# Patient Record
Sex: FEMALE | Race: WHITE
[De-identification: names, ages, dates, MRNs, and addresses within clinical notes are randomized per-mention and may not be internally consistent; named-entity substitution may affect disease eponyms.]

---

## 2017-12-30 ENCOUNTER — HOSPITAL ENCOUNTER (EMERGENCY)
Dept: HOSPITAL 45 - C.EDB | Age: 27
Discharge: HOME | End: 2017-12-30
Payer: COMMERCIAL

## 2017-12-30 VITALS
BODY MASS INDEX: 38.82 KG/M2 | BODY MASS INDEX: 38.82 KG/M2 | WEIGHT: 197.75 LBS | WEIGHT: 197.75 LBS | HEIGHT: 60 IN | HEIGHT: 60 IN

## 2017-12-30 VITALS — OXYGEN SATURATION: 99 % | DIASTOLIC BLOOD PRESSURE: 88 MMHG | SYSTOLIC BLOOD PRESSURE: 128 MMHG | HEART RATE: 74 BPM

## 2017-12-30 VITALS — TEMPERATURE: 97.34 F

## 2017-12-30 DIAGNOSIS — N23: Primary | ICD-10-CM

## 2017-12-30 LAB
ALBUMIN SERPL-MCNC: 3.7 GM/DL (ref 3.4–5)
ALP SERPL-CCNC: 111 U/L (ref 45–117)
ALT SERPL-CCNC: 24 U/L (ref 12–78)
AST SERPL-CCNC: 17 U/L (ref 15–37)
BASOPHILS # BLD: 0.03 K/UL (ref 0–0.2)
BASOPHILS NFR BLD: 0.2 %
BUN SERPL-MCNC: 17 MG/DL (ref 7–18)
CALCIUM SERPL-MCNC: 9 MG/DL (ref 8.5–10.1)
CO2 SERPL-SCNC: 29 MMOL/L (ref 21–32)
CREAT SERPL-MCNC: 0.86 MG/DL (ref 0.6–1.2)
EOS ABS #: 0.05 K/UL (ref 0–0.5)
EOSINOPHIL NFR BLD AUTO: 338 K/UL (ref 130–400)
GLUCOSE SERPL-MCNC: 139 MG/DL (ref 70–99)
HCT VFR BLD CALC: 41.9 % (ref 37–47)
HGB BLD-MCNC: 14.1 G/DL (ref 12–16)
IG#: 0.06 K/UL (ref 0–0.02)
IMM GRANULOCYTES NFR BLD AUTO: 27.9 %
LIPASE: 102 U/L (ref 73–393)
LYMPHOCYTES # BLD: 3.36 K/UL (ref 1.2–3.4)
MCH RBC QN AUTO: 26.7 PG (ref 25–34)
MCHC RBC AUTO-ENTMCNC: 33.7 G/DL (ref 32–36)
MCV RBC AUTO: 79.4 FL (ref 80–100)
MONO ABS #: 0.85 K/UL (ref 0.11–0.59)
MONOCYTES NFR BLD: 7.1 %
NEUT ABS #: 7.7 K/UL (ref 1.4–6.5)
NEUTROPHILS # BLD AUTO: 0.4 %
NEUTROPHILS NFR BLD AUTO: 63.9 %
PMV BLD AUTO: 9.4 FL (ref 7.4–10.4)
POTASSIUM SERPL-SCNC: 3.6 MMOL/L (ref 3.5–5.1)
PROT SERPL-MCNC: 8.9 GM/DL (ref 6.4–8.2)
RED CELL DISTRIBUTION WIDTH CV: 13.7 % (ref 11.5–14.5)
RED CELL DISTRIBUTION WIDTH SD: 38.9 FL (ref 36.4–46.3)
SODIUM SERPL-SCNC: 136 MMOL/L (ref 136–145)
WBC # BLD AUTO: 12.05 K/UL (ref 4.8–10.8)

## 2017-12-30 NOTE — EMERGENCY ROOM VISIT NOTE
History


Report prepared by Lorraine:  Queenie Oconnor


Under the Supervision of:  NIMESH MarinO.


First contact with patient:  15:26


Chief Complaint:  FLANK PAIN


Stated Complaint:  PAIN IN R SIDE





History of Present Illness


The patient is a 27 year old female who presents to the Emergency Room with 

complaints of persistent right lower quadrant pain that began a few days ago. 

The patient states that she has nausea and diarrhea.  He does describe the pain 

as a constant dullness.  Nothing really improves her pain significantly worsens 

it.  She describes her discomfort as similar to when she had kidney stones in 

the past. The patient states she took 2 Naproxen prior to arrival, which only 

relieved some of her discomfort.  She notes she has an IUD in place.  Pt denies 

headache, change in vision, fevers, chest pain, shortness of breath, vomiting, 

pain with urination, and melena. Patient denies diabetes, hypertension, 

hyperlipidemia, CAD, history of sudden death at a young age, and smoking.





   Source of History:  patient


   Onset:  few days ago


   Position:  abdomen (RLQ)


   Timing:  other (persistent)


   Associated Symptoms:  + nausea, + diarrhea





Review of Systems


See HPI for pertinent positives & negatives. A total of 10 systems reviewed and 

were otherwise negative.





Family History





Patient reports no known family medical history.





Social History


Smoking Status:  Never Smoker


Housing Status:  lives with family





Current/Historical Medications


Scheduled


Iud's (Paragard Intrauterine ), 1 EA INT UTER UD


Tamsulosin Hcl (Flomax), 0.4 MG PO DAILY





Scheduled PRN


Ibuprofen Tab (Advil), 200 MG PO UD PRN for Pain


Naproxen (Naprosyn), 250 MG PO UD PRN for Pain


Oxycodone Immediate Rel Tab (Roxicodone Ir), 5 MG PO Q4H PRN for Severe Pain





Allergies


Coded Allergies:  


     No Known Allergies (Unverified , 1/15/17)





Physical Exam


Vital Signs











  Date Time  Temp Pulse Resp B/P (MAP) Pulse Ox O2 Delivery O2 Flow Rate FiO2


 


12/30/17 18:44  74 16 128/88 99   


 


12/30/17 16:24  84 14 126/84 97   


 


12/30/17 15:22 36.3 105 16 161/83 99 Room Air  











Physical Exam


GENERAL: Sitting up in bed, alert, well appearing, well nourished, no distress, 

non-toxic 


EYE EXAM: normal conjunctiva. 


OROPHARYNX: no exudate, no erythema, lips, buccal mucosa, and tongue normal and 

mucous membranes are moist


NECK: supple, no nuchal rigidity, no adenopathy, non-tender


LUNGS: Clear to auscultation. Normal chest wall mechanics


HEART: no murmurs, S1 normal and S2 normal 


ABDOMEN: Minimal tenderness in right lower quadrant. Abdomen soft, non-tender, 

normo-active bowel sounds, no masses, no rebound or guarding. 


BACK: Back is symmetrical on inspection and there is no deformity, no midline 

tenderness, no CVA tenderness. 


SKIN: no rashes and no bruising 


UPPER EXTREMITIES: upper extremities are grossly normal. 


LOWER EXTREMITIES: No pitting edema.


NEURO EXAM: Normal sensorium, cranial nerves II-XII grossly intact, normal 

speech,  no gross weakness of arms, no gross weakness of legs.





Medical Decision & Procedures


ER Provider


Diagnostic Interpretation:


Radiology results as stated below per my review and the radiologist's 

interpretation: 





ABD/PELVIS NO IV OR ORAL CONT





CLINICAL HISTORY: 27 years-old Female presenting with rlq/flank pain . 





TECHNIQUE: Multidetector CT of the abdomen and pelvis was performed without the


use of intravenous contrast. IV contrast: None. A dose lowering technique was


used consistent with the principles of ALARA (as low as reasonably achievable). 





COMPARISON: 11/20/2016.





CT DOSE (mGy.cm): The estimated cumulative dose is 960.70 mGy.cm.





FINDINGS:





 topogram: Unremarkable.





Lung bases: Lungs and pleural spaces clear. Normal heart size. No pericardial or


pleural effusion. 





Liver: Normal morphology. Density borderline for hepatic steatosis.





Biliary: No gross biliary ductal dilatation allowing for noncontrast technique.


Gallbladder decompressed.





Pancreas: Normal noncontrast appearance.





Spleen: Normal noncontrast appearance.





Adrenal glands: Normal noncontrast appearance.





Kidneys and ureters: Normal parenchyma allowing for noncontrast technique.


Multiple bilateral punctate renal calculi. Minimal pelvocaliectasis on the right


with an obstructing 3 mm calculus at the right renal pelvis. The remainder of


the right ureter is normal. No left hydronephrosis. Left ureter normal.





Bladder: Normal.





Pelvic organs: An intrauterine device is in place. Otherwise normal noncontrast


appearance of the uterus and ovaries.





Bowel: Normal appendix. No bowel obstruction.





Peritoneal cavity: No free fluid or intraperitoneal gas.





Lymph nodes: No gross lymphadenopathy allowing for noncontrast technique.





Vasculature: Normal noncontrast appearance.





Abdominal wall: Small fat-containing umbilical hernia.





Musculoskeletal: Normal.





IMPRESSION:


1.  Obstructing 3 mm calculus at the right renal pelvis with resultant minimal


right hydronephrosis.





2.  Bilateral nephrolithiasis.





3.  Borderline hepatic steatosis.





Electronically signed by:  Alpesh Steele M.D.


12/30/2017 5:21 PM





Dictated Date/Time:  12/30/2017 5:16 PM





Laboratory Results


12/30/17 15:45








Red Blood Count 5.28, Mean Corpuscular Volume 79.4, Mean Corpuscular Hemoglobin 

26.7, Mean Corpuscular Hemoglobin Concent 33.7, Mean Platelet Volume 9.4, 

Neutrophils (%) (Auto) 63.9, Lymphocytes (%) (Auto) 27.9, Monocytes (%) (Auto) 

7.1, Eosinophils (%) (Auto) 0.4, Basophils (%) (Auto) 0.2, Neutrophils # (Auto) 

7.70, Lymphocytes # (Auto) 3.36, Monocytes # (Auto) 0.85, Eosinophils # (Auto) 

0.05, Basophils # (Auto) 0.03





12/30/17 15:45

















Test


  12/30/17


00:00 12/30/17


15:45


 


Urine Color YELLOW  


 


Urine Appearance CLEAR (CLEAR)  


 


Urine pH 5.5 (4.5-7.5)  


 


Urine Specific Gravity


  1.024


(1.000-1.030) 


 


 


Urine Protein NEG (NEG)  


 


Urine Glucose (UA) NEG (NEG)  


 


Urine Ketones NEG (NEG)  


 


Urine Occult Blood 3+ (NEG)  


 


Urine Nitrite NEG (NEG)  


 


Urine Bilirubin NEG (NEG)  


 


Urine Urobilinogen NEG (NEG)  


 


Urine Leukocyte Esterase NEG (NEG)  


 


Urine WBC (Auto) 1-5 /hpf (0-5)  


 


Urine RBC (Auto) >30 /hpf (0-4)  


 


Urine Hyaline Casts (Auto) 0 /lpf (0-5)  


 


Urine Epithelial Cells (Auto)


  20-30 /lpf


(0-5) 


 


 


Urine Bacteria (Auto) NEG (NEG)  


 


Urine Pregnancy Test NEG (NEG)  


 


White Blood Count


  


  12.05 K/uL


(4.8-10.8)


 


Red Blood Count


  


  5.28 M/uL


(4.2-5.4)


 


Hemoglobin


  


  14.1 g/dL


(12.0-16.0)


 


Hematocrit  41.9 % (37-47) 


 


Mean Corpuscular Volume


  


  79.4 fL


()


 


Mean Corpuscular Hemoglobin


  


  26.7 pg


(25-34)


 


Mean Corpuscular Hemoglobin


Concent 


  33.7 g/dl


(32-36)


 


Platelet Count


  


  338 K/uL


(130-400)


 


Mean Platelet Volume


  


  9.4 fL


(7.4-10.4)


 


Neutrophils (%) (Auto)  63.9 % 


 


Lymphocytes (%) (Auto)  27.9 % 


 


Monocytes (%) (Auto)  7.1 % 


 


Eosinophils (%) (Auto)  0.4 % 


 


Basophils (%) (Auto)  0.2 % 


 


Neutrophils # (Auto)


  


  7.70 K/uL


(1.4-6.5)


 


Lymphocytes # (Auto)


  


  3.36 K/uL


(1.2-3.4)


 


Monocytes # (Auto)


  


  0.85 K/uL


(0.11-0.59)


 


Eosinophils # (Auto)


  


  0.05 K/uL


(0-0.5)


 


Basophils # (Auto)


  


  0.03 K/uL


(0-0.2)


 


RDW Standard Deviation


  


  38.9 fL


(36.4-46.3)


 


RDW Coefficient of Variation


  


  13.7 %


(11.5-14.5)


 


Immature Granulocyte % (Auto)  0.5 % 


 


Immature Granulocyte # (Auto)


  


  0.06 K/uL


(0.00-0.02)


 


Anion Gap


  


  6.0 mmol/L


(3-11)


 


Est Creatinine Clear Calc


Drug Dose 


  98.9 ml/min 


 


 


Estimated GFR (


American) 


  108.1 


 


 


Estimated GFR (Non-


American 


  93.2 


 


 


BUN/Creatinine Ratio  19.2 (10-20) 


 


Calcium Level


  


  9.0 mg/dl


(8.5-10.1)


 


Total Bilirubin


  


  0.3 mg/dl


(0.2-1)


 


Direct Bilirubin


  


  < 0.1 mg/dl


(0-0.2)


 


Aspartate Amino Transf


(AST/SGOT) 


  17 U/L (15-37) 


 


 


Alanine Aminotransferase


(ALT/SGPT) 


  24 U/L (12-78) 


 


 


Alkaline Phosphatase


  


  111 U/L


()


 


Total Protein


  


  8.9 gm/dl


(6.4-8.2)


 


Albumin


  


  3.7 gm/dl


(3.4-5.0)


 


Lipase


  


  102 U/L


()





Laboratory results per my review.





Medications Administered











 Medications


  (Trade)  Dose


 Ordered  Sig/Tank


 Route  Start Time


 Stop Time Status Last Admin


Dose Admin


 


 Sodium Chloride  1,000 ml @ 


 999 mls/hr  Q1H1M STAT


 IV  12/30/17 15:32


 12/30/17 16:32 DC 12/30/17 15:46


999 MLS/HR


 


 Ketorolac


 Tromethamine


  (Toradol Inj)  15 mg  NOW  STAT


 IV  12/30/17 15:32


 12/30/17 15:33 DC 12/30/17 15:46


15 MG


 


 Oxycodone HCl


  (Roxicodone


 Immediate Rel 5MG


 Home Pack)  1 homepack  UD  ONCE


 PO  12/30/17 18:45


 12/30/17 18:46 DC 12/30/17 18:44


1 HOMEPACK


 


 Tamsulosin HCl


  (Flomax Cap)  0.8 mg  NOW  ONCE


 PO  12/30/17 18:45


 12/30/17 18:46 DC 12/30/17 18:43


0.8 MG











ED Course


ED COURSE: 


Vital signs were reviewed and showed hypertensive and tachycardic. 


The patients medical record was reviewed


The above diagnostic studies were performed and reviewed.


ED treatments and interventions as stated above. 





1528: The patient was evaluated in room B12. A complete history and physical 

examination was performed.





1532: Ordered Sodium Chloride 1000ml @ 999mls/hr IV and Toradol Inj 15mg IV. 





1804: Upon reevaluation, the patient is resting comfortably.I discussed my 

findings with the patient and she understands and agrees with the treatment 

plan.





Medical Decision








Differential diagnosis:


Etiologies such as appendicitis, diverticulitis, PUD, biliary pathology, UTI, 

pancreatitis, obstruction, mesenteric ischemia, aortic pathology, infections, 

inflammatory bowel disease, renal colic, as well as others were entertained. 








The patient is a 27 year old female who presents to the ED with complaints of 

abdominal pain.  Pain is located in the right lower quadrant described as a 

dull pain.  Mild leukocytosis.  BMP all LFTs, bilirubin, lipase was 

unremarkable.  Penicillin was negative.  UA with blood.  CT shows a 3 mm stone.

  Patient was updated bedside.  She was discharged follow-up with PCP and 

urology as an outpatient with OxyIR and Flomax. Discussed with Pt concerning 

signs and symptoms to watch out for. Pt was instructed to follow up with their 

PCP and discussed with the patient their option to return to the ED at anytime 

for persistent or worsening symptoms. The appropriate anticipatory guidance and 

out-patient management, including indications for return to the emergency 

department, were explained at length to the patient and understood.





[place] Drug Monitoring Program


Search Results:  patient reviewed within database, no issues identified





Medication Reconcilliation


Current Medication List:  was personally reviewed by me





Blood Pressure Screening


Patient's blood pressure:  Normal blood pressure





Impression





 Primary Impression:  


 Renal colic





Scribe Attestation


The scribe's documentation has been prepared under my direction and personally 

reviewed by me in its entirety. I confirm that the note above accurately 

reflects all work, treatment, procedures, and medical decision making performed 

by me.





Departure Information


Dispostion


Home / Self-Care





Prescriptions





Tamsulosin Hcl (FLOMAX) 0.4 Mg Cap


0.4 MG PO DAILY, #10 CAP


   Prov: Herbie Nolen, DO         12/30/17 


Oxycodone Immediate Rel Tab (ROXICODONE IR) 5 Mg Tab


5 MG PO Q4H Y for Severe Pain, #14 TAB


   Prov: Herbie Nolen, DO         12/30/17





Referrals


No Doctor, Assigned (PCP)





Forms


HOME CARE DOCUMENTATION FORM,                                                 

               IMPORTANT VISIT INFORMATION





Patient Instructions


My [facility]





Additional Instructions





Please follow up with your primary care doctor with in the next 24 hours.  Any 

worsening of your symptoms, please return to the ED immediately. This includes 

any fevers greater than 100.4, worsening pain, chest pain, shortness breath, 

persistent nausea, vomiting, unable to eat or drink, or any other concerning 

signs or symptoms from your standpoint.





You were given medications during this visit that will inhibit your ability to 

drive, operate machinery and work. Please do NOT drive, operate machinery or 

work for the next 12hrs. You were also given a prescription for a narcotic. 

While taking this medication you should also not drive, operate machinery and 

or work. 





Again any fevers above 100.4 please return immediately to the ER.

## 2017-12-30 NOTE — DIAGNOSTIC IMAGING REPORT
ABD/PELVIS NO IV OR ORAL CONT



CLINICAL HISTORY: 27 years-old Female presenting with rlq/flank pain . 



TECHNIQUE: Multidetector CT of the abdomen and pelvis was performed without the

use of intravenous contrast. IV contrast: None. A dose lowering technique was

used consistent with the principles of ALARA (as low as reasonably achievable). 



COMPARISON: 11/20/2016.



CT DOSE (mGy.cm): The estimated cumulative dose is 960.70 mGy.cm.



FINDINGS:



 topogram: Unremarkable.



Lung bases: Lungs and pleural spaces clear. Normal heart size. No pericardial or

pleural effusion. 



Liver: Normal morphology. Density borderline for hepatic steatosis.



Biliary: No gross biliary ductal dilatation allowing for noncontrast technique.

Gallbladder decompressed.



Pancreas: Normal noncontrast appearance.



Spleen: Normal noncontrast appearance.



Adrenal glands: Normal noncontrast appearance.



Kidneys and ureters: Normal parenchyma allowing for noncontrast technique.

Multiple bilateral punctate renal calculi. Minimal pelvocaliectasis on the right

with an obstructing 3 mm calculus at the right renal pelvis. The remainder of

the right ureter is normal. No left hydronephrosis. Left ureter normal.



Bladder: Normal.



Pelvic organs: An intrauterine device is in place. Otherwise normal noncontrast

appearance of the uterus and ovaries.



Bowel: Normal appendix. No bowel obstruction.



Peritoneal cavity: No free fluid or intraperitoneal gas.



Lymph nodes: No gross lymphadenopathy allowing for noncontrast technique.



Vasculature: Normal noncontrast appearance.



Abdominal wall: Small fat-containing umbilical hernia.



Musculoskeletal: Normal.



IMPRESSION:

1.  Obstructing 3 mm calculus at the right renal pelvis with resultant minimal

right hydronephrosis.



2.  Bilateral nephrolithiasis.



3.  Borderline hepatic steatosis.











Electronically signed by:  Alpesh Steele M.D.

12/30/2017 5:21 PM



Dictated Date/Time:  12/30/2017 5:16 PM

## 2018-01-18 ENCOUNTER — HOSPITAL ENCOUNTER (EMERGENCY)
Dept: HOSPITAL 45 - C.EDB | Age: 28
Discharge: HOME | End: 2018-01-18
Payer: COMMERCIAL

## 2018-01-18 VITALS
WEIGHT: 196.21 LBS | HEIGHT: 60 IN | BODY MASS INDEX: 38.52 KG/M2 | WEIGHT: 196.21 LBS | BODY MASS INDEX: 38.52 KG/M2 | HEIGHT: 60 IN

## 2018-01-18 VITALS — HEART RATE: 77 BPM | DIASTOLIC BLOOD PRESSURE: 64 MMHG | SYSTOLIC BLOOD PRESSURE: 125 MMHG | OXYGEN SATURATION: 100 %

## 2018-01-18 VITALS — TEMPERATURE: 97.52 F

## 2018-01-18 DIAGNOSIS — R10.31: Primary | ICD-10-CM

## 2018-01-18 DIAGNOSIS — R11.2: ICD-10-CM

## 2018-01-18 LAB
ALBUMIN SERPL-MCNC: 3.7 GM/DL (ref 3.4–5)
ALP SERPL-CCNC: 84 U/L (ref 45–117)
ALT SERPL-CCNC: 39 U/L (ref 12–78)
AST SERPL-CCNC: 26 U/L (ref 15–37)
BASOPHILS # BLD: 0.02 K/UL (ref 0–0.2)
BASOPHILS NFR BLD: 0.1 %
BUN SERPL-MCNC: 15 MG/DL (ref 7–18)
CALCIUM SERPL-MCNC: 9 MG/DL (ref 8.5–10.1)
CO2 SERPL-SCNC: 22 MMOL/L (ref 21–32)
CREAT SERPL-MCNC: 1.01 MG/DL (ref 0.6–1.2)
EOS ABS #: 0 K/UL (ref 0–0.5)
EOSINOPHIL NFR BLD AUTO: 302 K/UL (ref 130–400)
GLUCOSE SERPL-MCNC: 103 MG/DL (ref 70–99)
HCT VFR BLD CALC: 39.1 % (ref 37–47)
HGB BLD-MCNC: 13.6 G/DL (ref 12–16)
IG#: 0.07 K/UL (ref 0–0.02)
IMM GRANULOCYTES NFR BLD AUTO: 6.6 %
LIPASE: 118 U/L (ref 73–393)
LYMPHOCYTES # BLD: 1.23 K/UL (ref 1.2–3.4)
MCH RBC QN AUTO: 26.9 PG (ref 25–34)
MCHC RBC AUTO-ENTMCNC: 34.8 G/DL (ref 32–36)
MCV RBC AUTO: 77.3 FL (ref 80–100)
MONO ABS #: 0.31 K/UL (ref 0.11–0.59)
MONOCYTES NFR BLD: 1.7 %
NEUT ABS #: 16.97 K/UL (ref 1.4–6.5)
NEUTROPHILS # BLD AUTO: 0 %
NEUTROPHILS NFR BLD AUTO: 91.2 %
PMV BLD AUTO: 9.1 FL (ref 7.4–10.4)
POTASSIUM SERPL-SCNC: 3.4 MMOL/L (ref 3.5–5.1)
PROT SERPL-MCNC: 8.5 GM/DL (ref 6.4–8.2)
RED CELL DISTRIBUTION WIDTH CV: 13.6 % (ref 11.5–14.5)
RED CELL DISTRIBUTION WIDTH SD: 38.1 FL (ref 36.4–46.3)
SODIUM SERPL-SCNC: 137 MMOL/L (ref 136–145)
WBC # BLD AUTO: 18.6 K/UL (ref 4.8–10.8)

## 2018-01-18 NOTE — DIAGNOSTIC IMAGING REPORT
ULTRASOUND KIDNEYS AND BLADDER



CLINICAL HISTORY: Right flank pain.



COMPARISON STUDY: Abdominal CT dated 12/30/2017.



TECHNIQUE: Real-time, grayscale, and color flow sonography of the kidneys and

bladder is performed. Images are reviewed in the transverse and longitudinal

planes.



FINDINGS:



Kidneys: The kidneys are normal in size and echotexture. The right kidney

measures 10.9 cm in length and the left kidney measures 10.4 cm in length. 

There is no hydronephrosis. No shadowing renal calculi are identified. There is

no sonographic evidence of contour deforming renal mass lesion. No perinephric

fluid is identified.



Bladder: The bladder is normal in appearance. Bilateral ureteral jets were seen.



IMPRESSION: Unremarkable sonographic assessment of the kidneys and bladder.







Electronically signed by:  Jonas Camargo M.D.

1/18/2018 7:38 PM



Dictated Date/Time:  1/18/2018 7:38 PM

## 2018-01-18 NOTE — EMERGENCY ROOM VISIT NOTE
History


First contact with patient:  18:32


Chief Complaint:  KIDNEY STONE


Stated Complaint:  KIDNEY STONE, NAUSEA/VOMITING, CANNOT URINATE





History of Present Illness


The patient is a 27 year old female who presents to the Emergency Room with 

complaints of right flank pain.  The patient states that she had acute onset of 

right flank pain which started at 12:30 today.  The patient also admits to 

associated nausea or vomiting.  She also states she could only passed small 

amounts of urine.  She also noted some blood in her urine.  The patient took a 

oxycodone prior to arrival which took the edge off.  She was still having pain 

in the emergency room but had a long wait and she thinks she passed a stone 30 

minutes ago when she went to the bathroom.  The patient now still has dull 

slight pain in the right lower quadrant.  She currently denies any nausea or 

vomiting.  The patient states after she was in the emergency room in November 

she passed the stone and follow that with Dr. Martinez.





Review of Systems


10 system review was performed and was negative unless stated otherwise history 

of present illness.





Past Medical/Surgical History


Kidney stones,





Family History





Patient reports no known family medical history.





Social History


Smoking Status:  Never Smoker


Alcohol Use:  occasionally


Drug Use:  none


Marital Status:  single


Housing Status:  lives with significant other


Occupation Status:  unemployed





Current/Historical Medications


Scheduled


Iud's (Paragard Intrauterine ), 1 EA INT UTER UD





Scheduled PRN


Ibuprofen Tab (Advil), 200 MG PO UD PRN for Pain


Naproxen (Naprosyn), 250 MG PO UD PRN for Pain


Oxycodone Immediate Rel Tab (Roxicodone Ir), 5 MG PO Q4H PRN for Severe Pain


Tamsulosin Hcl (Flomax), 0.4 MG PO DAILY PRN for Kidney Stone





Physical Exam


Vital Signs











  Date Time  Temp Pulse Resp B/P (MAP) Pulse Ox O2 Delivery O2 Flow Rate FiO2


 


1/18/18 18:44  81 16 159/79 99 Room Air  


 


1/18/18 14:56 36.4 69 18 140/92 100   











Physical Exam


GENERAL: 27 year white female appears in no acute distress.


MENTAL Status: Alert and oriented 3.


MOUTH: Mucosa is moist


NECK: Supple, no lymphadenopathy noted.  No carotid bruits noted.


LUNGS: Clear auscultation without wheezes rales or rhonchi.


CARDIAC: Regular rate and rhythm without murmur.  Pulses is full and equal 

throughout.


BACK: No CVA tenderness noted.


ABDOMEN: Positive bowel sounds all 4 quadrants.  Soft, mild tenderness 

palpation of right lower quadrant otherwise nontender to palpation without 

organomegaly or masses.


EXTREMITIES: No cyanosis or edema noted.





Medical Decision & Procedures


ER Provider


Diagnostic Interpretation:





                                                                               

                                                                 


Patient Name: PORTILLO GUERRA                               Unit Number:  

[id number]                  


 








 











Dictated: 01/18/18 1938


 


Transcribed: 01/18/18 1938


 


EV


 


Printed Date/Time: [~ rep prt dt]/[~ rep prt tm]








 [~ rep ct labl] - [~ rep ct ivnm]


 





   [facility]


 Radiology Department


 [place] [address]


 [phone]





 











Dictated: 01/18/18 1938


 


Transcribed: 01/18/18 1938


 


EV


 


Printed Date/Time: [~ rep prt dt]/[~ rep prt tm]








 [~ rep ct labl] - [~ rep ct ivnm]


 





ULTRASOUND KIDNEYS AND BLADDER





CLINICAL HISTORY: Right flank pain.





COMPARISON STUDY: Abdominal CT dated 12/30/2017.





TECHNIQUE: Real-time, grayscale, and color flow sonography of the kidneys and


bladder is performed. Images are reviewed in the transverse and longitudinal


planes.





FINDINGS:





Kidneys: The kidneys are normal in size and echotexture. The right kidney


measures 10.9 cm in length and the left kidney measures 10.4 cm in length. 


There is no hydronephrosis. No shadowing renal calculi are identified. There is


no sonographic evidence of contour deforming renal mass lesion. No perinephric


fluid is identified.





Bladder: The bladder is normal in appearance. Bilateral ureteral jets were seen.





IMPRESSION: Unremarkable sonographic assessment of the kidneys and bladder.











Electronically signed by:  Jonas Camargo M.D.


1/18/2018 7:38 PM





Dictated Date/Time:  1/18/2018 7:38 PM





 The status of this report is Signed.   


 Draft = Not yet reviewed or approved by Radiologist.  


 Signed = Reviewed and approved by Radiologist.


<AttendingPhy></AttendingPhy> <FamilyPhy>Apoorav Rasmussen D.O.</FamilyPhy> <

PrimaryPhy>Apoorva Rasmussen D.O.</PrimaryPhy> <UnitNumber>[id number]</UnitNumber> 

<VisitNumber>[id number]</VisitNumber> <PatientName>PORTILLO GUERRA</

PatientName> <DateOfBirth>1990</DateOfBirth> <Location>[facility]</Location> <

ServiceDate>01/18/18</ServiceDate> <MNE>ESINDI</MNE> <OrderingPhy>Aida Meza PA-C</OrderingPhy> <OrderingPhyMNE>f rep ord dr steel</OrderingPhyMNE> <

DictatingPhyMNE>f rep dict dr steel</DictatingPhyMNE> <CCListMNE>f rep ct mne</

CCListMNE> <AdmittingPhyMNE>f pt admit dr steel</AdmittingPhyMNE> <AttendingPhyMNE

>f pt attend dr steel</AttendingPhyMNE>


<ConsultingPhyMNE>f pt consult dr steel</ConsultingPhyMNE> <FamilyPhyMNE>f pt fam 

dr steel</FamilyPhyMNE> <OtherPhyMNE>f pt other dr steel</OtherPhyMNE> <

PrimaryPhyMNE>f pt prim care dr steel</PrimaryPhyMNE> <ReferringPhyMNE>f pt 

referring dr steel</ReferringPhyMNE>





Laboratory Results


1/18/18 19:00








Red Blood Count 5.06, Mean Corpuscular Volume 77.3, Mean Corpuscular Hemoglobin 

26.9, Mean Corpuscular Hemoglobin Concent 34.8, Mean Platelet Volume 9.1, 

Neutrophils (%) (Auto) 91.2, Lymphocytes (%) (Auto) 6.6, Monocytes (%) (Auto) 

1.7, Eosinophils (%) (Auto) 0.0, Basophils (%) (Auto) 0.1, Neutrophils # (Auto) 

16.97, Lymphocytes # (Auto) 1.23, Monocytes # (Auto) 0.31, Eosinophils # (Auto) 

0.00, Basophils # (Auto) 0.02





1/18/18 19:00

















Test


  1/18/18


18:50 1/18/18


19:00


 


Urine Color YELLOW  


 


Urine Appearance CLEAR (CLEAR)  


 


Urine pH 7.0 (4.5-7.5)  


 


Urine Specific Gravity


  1.014


(1.000-1.030) 


 


 


Urine Protein NEG (NEG)  


 


Urine Glucose (UA) NEG (NEG)  


 


Urine Ketones 1+ (NEG)  


 


Urine Occult Blood 2+ (NEG)  


 


Urine Nitrite NEG (NEG)  


 


Urine Bilirubin NEG (NEG)  


 


Urine Urobilinogen NEG (NEG)  


 


Urine Leukocyte Esterase NEG (NEG)  


 


Urine WBC (Auto) 1-5 /hpf (0-5)  


 


Urine RBC (Auto) >30 /hpf (0-4)  


 


Urine Hyaline Casts (Auto) 1-5 /lpf (0-5)  


 


Urine Epithelial Cells (Auto)


  5-10 /lpf


(0-5) 


 


 


Urine Bacteria (Auto) NEG (NEG)  


 


White Blood Count


  


  18.60 K/uL


(4.8-10.8)


 


Red Blood Count


  


  5.06 M/uL


(4.2-5.4)


 


Hemoglobin


  


  13.6 g/dL


(12.0-16.0)


 


Hematocrit  39.1 % (37-47) 


 


Mean Corpuscular Volume


  


  77.3 fL


()


 


Mean Corpuscular Hemoglobin


  


  26.9 pg


(25-34)


 


Mean Corpuscular Hemoglobin


Concent 


  34.8 g/dl


(32-36)


 


Platelet Count


  


  302 K/uL


(130-400)


 


Mean Platelet Volume


  


  9.1 fL


(7.4-10.4)


 


Neutrophils (%) (Auto)  91.2 % 


 


Lymphocytes (%) (Auto)  6.6 % 


 


Monocytes (%) (Auto)  1.7 % 


 


Eosinophils (%) (Auto)  0.0 % 


 


Basophils (%) (Auto)  0.1 % 


 


Neutrophils # (Auto)


  


  16.97 K/uL


(1.4-6.5)


 


Lymphocytes # (Auto)


  


  1.23 K/uL


(1.2-3.4)


 


Monocytes # (Auto)


  


  0.31 K/uL


(0.11-0.59)


 


Eosinophils # (Auto)


  


  0.00 K/uL


(0-0.5)


 


Basophils # (Auto)


  


  0.02 K/uL


(0-0.2)


 


RDW Standard Deviation


  


  38.1 fL


(36.4-46.3)


 


RDW Coefficient of Variation


  


  13.6 %


(11.5-14.5)


 


Immature Granulocyte % (Auto)  0.4 % 


 


Immature Granulocyte # (Auto)


  


  0.07 K/uL


(0.00-0.02)


 


Anion Gap


  


  11.0 mmol/L


(3-11)


 


Est Creatinine Clear Calc


Drug Dose 


  83.8 ml/min 


 


 


Estimated GFR (


American) 


  89.0 


 


 


Estimated GFR (Non-


American 


  76.8 


 


 


BUN/Creatinine Ratio  14.4 (10-20) 


 


Calcium Level


  


  9.0 mg/dl


(8.5-10.1)


 


Total Bilirubin


  


  0.4 mg/dl


(0.2-1)


 


Direct Bilirubin


  


  < 0.1 mg/dl


(0-0.2)


 


Aspartate Amino Transf


(AST/SGOT) 


  26 U/L (15-37) 


 


 


Alanine Aminotransferase


(ALT/SGPT) 


  39 U/L (12-78) 


 


 


Alkaline Phosphatase


  


  84 U/L


()


 


Total Protein


  


  8.5 gm/dl


(6.4-8.2)


 


Albumin


  


  3.7 gm/dl


(3.4-5.0)


 


Lipase


  


  118 U/L


()











Medications Administered











 Medications


  (Trade)  Dose


 Ordered  Sig/Tank


 Route  Start Time


 Stop Time Status Last Admin


Dose Admin


 


 Ketorolac


 Tromethamine


  (Toradol Inj)  30 mg  NOW  STAT


 IV  1/18/18 19:54


 1/18/18 19:56 DC 1/18/18 20:10


30 MG


 


 Ondansetron HCl


  (Zofran Inj)  4 mg  NOW  STAT


 IV  1/18/18 19:54


 1/18/18 19:56 DC 1/18/18 20:10


4 MG











ED Course


The patient was evaluated.  The patient's EMR medication list were reviewed.  

IV access was obtained.  CBC differential, renal profile, LFTs and lipase 

levels were ordered.  Urinalysis was ordered.  Renal ultrasound was ordered and 

interpreted by the radiologist as above without any significant findings.  The 

patient was reevaluated.  The patient was starting to get pain again and 

nauseated therefore she was given Toradol 30 mg IV and Zofran 4 mg IV push.  

Labs are reviewed.  The patient's white count was elevated 18,000 otherwise 

labs are unremarkable.  Urinalysis was negative.  The patient was reevaluated 

was feeling better.  The patient was discharged home with a Zofran home pack 

and a oxy ir home pack.





Medical Decision


Differential diagnoses include UTI, pyelonephritis, renal colic, kidney stone





[place] Drug Monitoring Program


Search Results:  patient reviewed within database





Medication Reconcilliation


Current Medication List:  was personally reviewed by me





Blood Pressure Screening


Patient's blood pressure:  Normal blood pressure





Impression





 Primary Impression:  


 Right flank pain





Departure Information


Dispostion


Home / Self-Care





Condition


GOOD





Prescriptions





Oxycodone Immediate Rel Tab (ROXICODONE IR) 5 Mg Tab


1-2 TAB PO Q4H Y for Pain, #14 TAB


   Prov: Aida Meza PA-C         1/18/18 


Ondasetron Odt (ZOFRAN ODT) 4 Mg Tab


4 MG SL Q6H for Nausea, #10 TAB


   Prov: Aida Meza PA-C         1/18/18 


Tamsulosin Hcl (FLOMAX) 0.4 Mg Cap


0.4 MG PO DAILY for 7 Days, #7 CAP


   Prov: Aida Meza PA-C         1/18/18





Referrals


Apoorva Rasmussen D.O. (PCP)





Forms


HOME CARE DOCUMENTATION FORM,                                                 

               IMPORTANT VISIT INFORMATION





Patient Instructions


Kidney Stones - [facility], [facility]





Additional Instructions





Ibuprofen 600 mg every 6 hours with food for pain.  Take OxyIR as needed for 

more severe pain.  Do not drive while taking the OxyIR.  Take Zofran as needed 

for nausea.  Push fluids.  Strain all urine.  Take Flomax daily as prescribed.  

If symptoms persist, follow-up with Dr. Martinez.  If symptoms worsen in the 

interim, return to ER.